# Patient Record
Sex: MALE | Race: AMERICAN INDIAN OR ALASKA NATIVE | NOT HISPANIC OR LATINO | URBAN - NONMETROPOLITAN AREA
[De-identification: names, ages, dates, MRNs, and addresses within clinical notes are randomized per-mention and may not be internally consistent; named-entity substitution may affect disease eponyms.]

---

## 2019-09-20 NOTE — PATIENT DISCUSSION
New Prescription: Maxitrol (neomycin-polymyxin-dexameth): ointment: 3.5-10,000-0.1 mg-unit/g-% 1 a small amount four times a day into left eye 09-

## 2019-10-23 NOTE — PATIENT DISCUSSION
Pt asked for glasses Rx and advised likely will NOT give clear vision due to cataract blur and advised better to consult with [facility].

## 2019-11-27 ENCOUNTER — IMPORTED ENCOUNTER (OUTPATIENT)
Dept: URBAN - NONMETROPOLITAN AREA CLINIC 1 | Facility: CLINIC | Age: 69
End: 2019-11-27

## 2019-11-27 PROBLEM — H52.03: Noted: 2019-11-27

## 2019-11-27 PROBLEM — H16.223: Noted: 2019-11-27

## 2019-11-27 PROBLEM — H52.4: Noted: 2019-11-27

## 2019-11-27 PROBLEM — H25.13: Noted: 2019-11-27

## 2019-11-27 PROBLEM — H52.223: Noted: 2019-11-27

## 2019-11-27 PROCEDURE — 92015 DETERMINE REFRACTIVE STATE: CPT

## 2019-11-27 PROCEDURE — 92014 COMPRE OPH EXAM EST PT 1/>: CPT

## 2020-01-07 NOTE — PATIENT DISCUSSION
PTERYGIUM, OS:  SLOWLY WORSENING. PRESCRIBE ARTIFICIAL TEARS AS NEEDED AND INCREASE UV PROTECTION. SCHEDULE SURGERY.

## 2020-01-14 NOTE — PATIENT DISCUSSION
Continue: Maxitrol (neomycin-polymyxin-dexameth): ointment: 3.5-10,000-0.1 mg-unit/g-% a small amount four times a day into left eye

## 2020-05-05 ENCOUNTER — IMPORTED ENCOUNTER (OUTPATIENT)
Dept: URBAN - NONMETROPOLITAN AREA CLINIC 1 | Facility: CLINIC | Age: 70
End: 2020-05-05

## 2020-05-05 PROBLEM — H16.293: Noted: 2020-05-05

## 2020-05-05 PROBLEM — H52.03: Noted: 2020-05-05

## 2020-05-05 PROBLEM — H16.223: Noted: 2020-05-05

## 2020-05-05 PROBLEM — H25.13: Noted: 2020-05-05

## 2020-05-05 PROBLEM — H52.4: Noted: 2020-05-05

## 2020-05-05 PROBLEM — H52.223: Noted: 2020-05-05

## 2020-05-05 PROCEDURE — 92012 INTRM OPH EXAM EST PATIENT: CPT

## 2020-05-05 NOTE — PATIENT DISCUSSION
Keratoconjunctivitis OU-  discussed findings w/patient-  start Tobradex QID OU-  start Refresh PF QID OU-  RTC 1 week f/u--------------------------------notes below from previous--------------Cataracts OU-  discussed findings w/patient-  no treatment indicated at this time-  UV protection recommended-  continue to monitor yearly or prnDES OU-  discussed findings w/patient-  patient's signs/symptoms are worsening with current treatment-  start Restasis BID OU Rx sent to [facility]-  continue AT's PRN OU-  continue bedtime nj QHS OU-  RTC 1 year or prn; 's Notes: MR 11/27/2019DFE 11/27/2019

## 2020-05-14 ENCOUNTER — IMPORTED ENCOUNTER (OUTPATIENT)
Dept: URBAN - NONMETROPOLITAN AREA CLINIC 1 | Facility: CLINIC | Age: 70
End: 2020-05-14

## 2020-05-14 PROCEDURE — 99213 OFFICE O/P EST LOW 20 MIN: CPT

## 2020-05-14 NOTE — PATIENT DISCUSSION
Keratoconjunctivitis OU-  discussed findings w/patient-  d/c Tobradex QID OU-  continue Refresh PF QID OU-  NOTE: 5/14/2020 Patient unable to start Restasis d/t cost.-  start PF 1% QID x 2 weeks then BID x 2 weeks then RTC -  RTC 4 week f/u--------------------------------notes below from previous--------------Cataracts OU-  discussed findings w/patient-  no treatment indicated at this time-  UV protection recommended-  continue to monitor yearly or prnDES OU-  discussed findings w/patient-  patient's signs/symptoms are worsening with current treatment-  start Restasis BID OU Rx sent to [address] [place] [address]-  continue AT's PRN OU-  continue bedtime nj QHS OU-  RTC 1 year or prn; 's Notes: MR 11/27/2019DFE 11/27/2019

## 2020-06-23 ENCOUNTER — IMPORTED ENCOUNTER (OUTPATIENT)
Dept: URBAN - NONMETROPOLITAN AREA CLINIC 1 | Facility: CLINIC | Age: 70
End: 2020-06-23

## 2020-06-23 PROCEDURE — 99213 OFFICE O/P EST LOW 20 MIN: CPT

## 2020-06-23 NOTE — PATIENT DISCUSSION
Keratoconjunctivitis OU-  discussed findings w/patient-  patient was unable to afford Restasis -  tried to insert punctum plugs today but the punctum were extremely small and was not able to get plugs placed-  taper generic Pred Forte 1% BID OU-  start Xiidra BID OU Rx sent to pharmacy-  RTC 2-3 week f/u; 's Notes: MR 11/27/2019DFE 11/27/2019

## 2020-06-30 NOTE — PATIENT DISCUSSION
Stopped Today: Lotemax SM (loteprednol etabonate): drops,gel: 0.38% 1 drop every morning into left eye

## 2020-07-07 NOTE — PATIENT DISCUSSION
The patient feels that the cataract is significantly impacting daily activities and has elected cataract surgery. The risks, benefits, and alternatives to surgery were discussed. The patient elects to proceed with surgery. Composite Graft Text: The defect edges were debeveled with a #15 scalpel blade.  Given the location of the defect, shape of the defect, the proximity to free margins and the fact the defect was full thickness a composite graft was deemed most appropriate.  The defect was outline and then transferred to the donor site.  A full thickness graft was then excised from the donor site. The graft was then placed in the primary defect, oriented appropriately and then sutured into place.  The secondary defect was then repaired using a primary closure.

## 2020-07-08 NOTE — PATIENT DISCUSSION
Cataract surgery has been performed in the first eye and activities of daily living are still impaired. The patient would like to proceed with cataract surgery in the second eye as scheduled. The patient elects IOL OD Goal Emmetropia Basic.

## 2020-07-14 ENCOUNTER — PREPPED CHART (OUTPATIENT)
Dept: URBAN - NONMETROPOLITAN AREA CLINIC 4 | Facility: CLINIC | Age: 70
End: 2020-07-14

## 2020-07-14 ENCOUNTER — IMPORTED ENCOUNTER (OUTPATIENT)
Dept: URBAN - NONMETROPOLITAN AREA CLINIC 1 | Facility: CLINIC | Age: 70
End: 2020-07-14

## 2020-07-14 PROCEDURE — 99213 OFFICE O/P EST LOW 20 MIN: CPT

## 2020-07-14 NOTE — PATIENT DISCUSSION
ASHU/Keratoconjunctivitis OU-  discussed findings w/patient-  patient was unable to afford Restasis -  tried to insert punctum plugs today but the punctum were extremely small and was not able to get plugs placed- patient states he is using Refresh [place] 3 OU BID-TID PRN- d/c Pred Forte at this time- continue Xiidra OU BID as directed-  RTC 3 months for recheckCataracts OU-  discussed findings w/patient-  no treatment indicated at this time-  UV protection recommended-  continue to monitor yearly or prn; 's Notes: MR 11/27/2019DFE 11/27/2019

## 2022-03-08 ASSESSMENT — TONOMETRY
OS_IOP_MMHG: 18
OD_IOP_MMHG: 18

## 2022-03-08 ASSESSMENT — VISUAL ACUITY
OD_CC: 20/30-2
OS_CC: 20/30+2

## 2022-03-10 ENCOUNTER — COMPREHENSIVE EXAM (OUTPATIENT)
Dept: URBAN - NONMETROPOLITAN AREA CLINIC 4 | Facility: CLINIC | Age: 72
End: 2022-03-10

## 2022-03-10 DIAGNOSIS — H52.03: ICD-10-CM

## 2022-03-10 DIAGNOSIS — H16.223: ICD-10-CM

## 2022-03-10 DIAGNOSIS — H25.13: ICD-10-CM

## 2022-03-10 PROCEDURE — 92015 DETERMINE REFRACTIVE STATE: CPT

## 2022-03-10 PROCEDURE — 92014 COMPRE OPH EXAM EST PT 1/>: CPT

## 2022-03-10 ASSESSMENT — VISUAL ACUITY
OS_CC: 20/25-2
OS_BAT: 20/50
OD_CC: 20/50
OD_BAT: 20/100
OD_PH: 20/50+1

## 2022-03-10 NOTE — PATIENT DISCUSSION
(Surgical) Visually Significant (secondary to glare), discussed the risks, benefits, alternatives, and limitations of cataract surgery. The patient stated a full understanding and a desire to proceed with the procedure. The patient will need to return for pre-op appointment with cataract measurements and to have any additional questions answered, and start pre-operative eye drops as directed.

## 2022-03-10 NOTE — PATIENT DISCUSSION
Will have patient start back on Xiidra BID OU, samples given.  Will help patient w/[employer] paperwork.

## 2022-04-09 ASSESSMENT — VISUAL ACUITY
OD_SC: 20/60+1
OS_SC: 20/30+2
OS_SC: 20/25+2
OD_PH: 20/30
OD_SC: 20/40-2
OD_SC: 20/50+2
OS_SC: 20/200+1
OD_PH: 20/50+1
OS_PH: 20/40
OD_SC: 20/30-2
OS_SC: 20/30-2

## 2022-04-09 ASSESSMENT — TONOMETRY
OD_IOP_MMHG: 18
OS_IOP_MMHG: 18
OD_IOP_MMHG: 18
OD_IOP_MMHG: 17
OS_IOP_MMHG: 18
OS_IOP_MMHG: 18
OS_IOP_MMHG: 17
OS_IOP_MMHG: 18
OD_IOP_MMHG: 18
OD_IOP_MMHG: 18

## 2022-04-26 ENCOUNTER — CONSULTATION/EVALUATION (OUTPATIENT)
Dept: URBAN - NONMETROPOLITAN AREA CLINIC 4 | Facility: CLINIC | Age: 72
End: 2022-04-26

## 2022-04-26 DIAGNOSIS — H25.13: ICD-10-CM

## 2022-04-26 PROCEDURE — 99214 OFFICE O/P EST MOD 30 MIN: CPT

## 2022-04-26 ASSESSMENT — VISUAL ACUITY
OS_CC: 20/30
OS_PH: 20/30
OS_SC: 20/400
OD_SC: 20/100
OD_CC: 20/50
OS_AM: 20/30
OD_CC: 20/100
OD_PH: 20/40
OD_PAM: 20/40-1
OS_CC: 20/40

## 2022-04-26 ASSESSMENT — TONOMETRY
OS_IOP_MMHG: 14
OD_IOP_MMHG: 14

## 2022-04-26 NOTE — PATIENT DISCUSSION
(Surgical) Visually Significant (secondary to glare), discussed the risks, benefits, alternatives, and limitations of cataract surgery. The patient stated a full understanding and a desire to proceed with the procedure. The patient will need to return for pre-op appointment with cataract measurements and to have any additional questions answered, and start pre-operative eye drops as directed. Pt elects to proceed with CE OS first and then OD after with LRI/LenSx OU & discussed the need for reading glasses. Pt elects LenSx OU. Dextenza OU +.

## 2022-06-02 ENCOUNTER — PRE-OP/H&P (OUTPATIENT)
Dept: URBAN - NONMETROPOLITAN AREA CLINIC 4 | Facility: CLINIC | Age: 72
End: 2022-06-02

## 2022-06-02 VITALS
SYSTOLIC BLOOD PRESSURE: 113 MMHG | HEART RATE: 110 BPM | HEIGHT: 72 IN | BODY MASS INDEX: 30.88 KG/M2 | WEIGHT: 228 LBS | DIASTOLIC BLOOD PRESSURE: 76 MMHG

## 2022-06-02 DIAGNOSIS — H16.223: ICD-10-CM

## 2022-06-02 PROCEDURE — 99499 UNLISTED E&M SERVICE: CPT

## 2022-06-23 ENCOUNTER — POST-OP (OUTPATIENT)
Dept: URBAN - METROPOLITAN AREA SURGERY 3 | Facility: SURGERY | Age: 72
End: 2022-06-23

## 2022-06-23 ENCOUNTER — SURGERY/PROCEDURE (OUTPATIENT)
Dept: URBAN - METROPOLITAN AREA SURGERY 3 | Facility: SURGERY | Age: 72
End: 2022-06-23

## 2022-06-23 DIAGNOSIS — Z96.1: ICD-10-CM

## 2022-06-23 DIAGNOSIS — H16.223: ICD-10-CM

## 2022-06-23 DIAGNOSIS — H25.12: ICD-10-CM

## 2022-06-23 PROCEDURE — 66984 XCAPSL CTRC RMVL W/O ECP: CPT

## 2022-06-23 PROCEDURE — 99024 POSTOP FOLLOW-UP VISIT: CPT

## 2022-06-23 PROCEDURE — 68841 INSJ RX ELUT IMPLT LAC CANAL: CPT

## 2022-06-23 ASSESSMENT — TONOMETRY: OS_IOP_MMHG: 24

## 2022-06-23 ASSESSMENT — VISUAL ACUITY: OS_SC: 20/200

## 2022-06-30 ENCOUNTER — POST OP/EVAL OF SECOND EYE (OUTPATIENT)
Dept: RURAL CLINIC 1 | Facility: CLINIC | Age: 72
End: 2022-06-30

## 2022-06-30 VITALS — HEIGHT: 60 IN | SYSTOLIC BLOOD PRESSURE: 135 MMHG | DIASTOLIC BLOOD PRESSURE: 95 MMHG | HEART RATE: 112 BPM

## 2022-06-30 DIAGNOSIS — Z96.1: ICD-10-CM

## 2022-06-30 PROCEDURE — 99024 POSTOP FOLLOW-UP VISIT: CPT

## 2022-06-30 ASSESSMENT — TONOMETRY
OD_IOP_MMHG: 15
OS_IOP_MMHG: 15

## 2022-06-30 ASSESSMENT — VISUAL ACUITY
OS_SC: 20/20
OD_SC: 20/40-2
OD_BAT: 20/200

## 2022-06-30 NOTE — PATIENT DISCUSSION
Medical clearance done today. No outstanding concerns that would preclude surgery. Patient is cleared to proceed with scheduled surgery. Medically cleared for surgery but the anesthesia team will have to make appropriate decision to make patient cooperative.

## 2022-07-11 ENCOUNTER — SURGERY/PROCEDURE (OUTPATIENT)
Dept: URBAN - METROPOLITAN AREA SURGERY 3 | Facility: SURGERY | Age: 72
End: 2022-07-11

## 2022-07-11 ENCOUNTER — EMERGENCY VISIT (OUTPATIENT)
Dept: URBAN - NONMETROPOLITAN AREA CLINIC 4 | Facility: CLINIC | Age: 72
End: 2022-07-11

## 2022-07-11 DIAGNOSIS — H25.11: ICD-10-CM

## 2022-07-11 DIAGNOSIS — Z96.1: ICD-10-CM

## 2022-07-11 PROCEDURE — 66984 XCAPSL CTRC RMVL W/O ECP: CPT

## 2022-07-11 PROCEDURE — 92014 COMPRE OPH EXAM EST PT 1/>: CPT

## 2022-07-11 PROCEDURE — 68841 INSJ RX ELUT IMPLT LAC CANAL: CPT

## 2022-07-11 PROCEDURE — S9986 NOT MEDICALLY NECESSARY SVC: HCPCS

## 2022-07-11 NOTE — PATIENT DISCUSSION
Patient just had sx, high IOP's. Vyzulta and burping done at [facility] [place] ([facility]). IOP's went from 40 to 19 after Vyzulta.

## 2022-07-12 ENCOUNTER — POST-OP (OUTPATIENT)
Dept: URBAN - NONMETROPOLITAN AREA CLINIC 4 | Facility: CLINIC | Age: 72
End: 2022-07-12

## 2022-07-12 DIAGNOSIS — Z96.1: ICD-10-CM

## 2022-07-12 PROCEDURE — 99024 POSTOP FOLLOW-UP VISIT: CPT

## 2022-07-12 ASSESSMENT — TONOMETRY
OD_IOP_MMHG: 19
OS_IOP_MMHG: 17
OD_IOP_MMHG: 19
OD_IOP_MMHG: 40
OD_IOP_MMHG: 15

## 2022-07-12 ASSESSMENT — VISUAL ACUITY
OD_SC: 20/100
OS_SC: 20/100
OD_SC: 20/70

## 2022-07-12 NOTE — PATIENT DISCUSSION
Patient just had sx, high IOP's. Vyzulta and burping done at [facility] ([facility]). IOP's went from 40 to 19 after Vyzulta.

## 2022-07-20 ENCOUNTER — POST-OP (OUTPATIENT)
Dept: URBAN - NONMETROPOLITAN AREA CLINIC 4 | Facility: CLINIC | Age: 72
End: 2022-07-20

## 2022-07-20 DIAGNOSIS — Z96.1: ICD-10-CM

## 2022-07-20 PROCEDURE — 99024 POSTOP FOLLOW-UP VISIT: CPT

## 2022-07-20 ASSESSMENT — TONOMETRY
OS_IOP_MMHG: 16
OD_IOP_MMHG: 14

## 2022-07-20 ASSESSMENT — VISUAL ACUITY
OS_SC: 20/40+1
OD_SC: 20/25

## 2022-09-22 ENCOUNTER — EMERGENCY VISIT (OUTPATIENT)
Dept: URBAN - NONMETROPOLITAN AREA CLINIC 4 | Facility: CLINIC | Age: 72
End: 2022-09-22

## 2022-09-22 DIAGNOSIS — Z96.1: ICD-10-CM

## 2022-09-22 PROCEDURE — 99024 POSTOP FOLLOW-UP VISIT: CPT

## 2022-09-22 RX ORDER — NEOMYCIN SULFATE, POLYMYXIN B SULFATE AND DEXAMETHASONE 3.5; 10000; 1 MG/ML; [USP'U]/ML; MG/ML
1 SUSPENSION OPHTHALMIC
Start: 2022-09-22

## 2022-09-22 ASSESSMENT — VISUAL ACUITY
OD_SC: 20/30
OU_SC: 20/25
OS_SC: 20/30

## 2022-09-22 ASSESSMENT — TONOMETRY
OD_IOP_MMHG: 14
OS_IOP_MMHG: 15

## 2022-12-08 ENCOUNTER — EMERGENCY VISIT (OUTPATIENT)
Dept: URBAN - NONMETROPOLITAN AREA CLINIC 4 | Facility: CLINIC | Age: 72
End: 2022-12-08

## 2022-12-08 PROCEDURE — 92134 CPTRZ OPH DX IMG PST SGM RTA: CPT

## 2022-12-08 PROCEDURE — 92015 DETERMINE REFRACTIVE STATE: CPT

## 2022-12-08 PROCEDURE — 99214 OFFICE O/P EST MOD 30 MIN: CPT

## 2022-12-08 RX ORDER — LOTEPREDNOL ETABONATE 5 MG/ML: 1 SUSPENSION/ DROPS OPHTHALMIC

## 2022-12-08 ASSESSMENT — VISUAL ACUITY
OD_BAT: 20/80
OS_SC: 20/30
OS_BAT: 20/50
OD_SC: 20/40

## 2022-12-08 NOTE — PATIENT DISCUSSION
OCT done today. ERM noted will bring patient back in a month to scan again to see if there is any progression. Observe for change.

## 2022-12-08 NOTE — PATIENT DISCUSSION
1 week PO: Patient is doing well post-operatively. The importance of post-op drop compliance was emphasized. Drop scheduled reviewed with patient. Patient to call if any visual changes or concerns. “You can access the [facility] Patient Portal, offered by [facility], by registering with the following website: [email or web address]”

## 2022-12-08 NOTE — PATIENT DISCUSSION
Discussed diagnosis in detail with patient. Recommend patient using Lotemax QID for a week, TID for a week, BID for a week, once a day for a week. Will see patient in a month for follow up. Continue to monitor.

## 2023-01-10 ENCOUNTER — FOLLOW UP (OUTPATIENT)
Dept: URBAN - NONMETROPOLITAN AREA CLINIC 4 | Facility: CLINIC | Age: 73
End: 2023-01-10

## 2023-01-10 DIAGNOSIS — H10.13: ICD-10-CM

## 2023-01-10 DIAGNOSIS — H01.014: ICD-10-CM

## 2023-01-10 PROCEDURE — 99214 OFFICE O/P EST MOD 30 MIN: CPT

## 2023-01-10 RX ORDER — CYCLOSPORINE 0.5 MG/ML: 1 EMULSION OPHTHALMIC TWICE A DAY

## 2023-01-10 ASSESSMENT — TONOMETRY
OS_IOP_MMHG: 11
OD_IOP_MMHG: 11

## 2023-01-10 ASSESSMENT — VISUAL ACUITY
OD_SC: 20/40
OS_SC: 20/25

## 2023-01-10 NOTE — PATIENT DISCUSSION
OCT done previously. ERM noted will bring patient back in a month to scan again to see if there is any progression. Observe for change.

## 2023-08-21 ENCOUNTER — FOLLOW UP (OUTPATIENT)
Dept: RURAL CLINIC 1 | Facility: CLINIC | Age: 73
End: 2023-08-21

## 2023-08-21 DIAGNOSIS — Z96.1: ICD-10-CM

## 2023-08-21 DIAGNOSIS — H35.373: ICD-10-CM

## 2023-08-21 DIAGNOSIS — H16.223: ICD-10-CM

## 2023-08-21 PROCEDURE — 92014 COMPRE OPH EXAM EST PT 1/>: CPT

## 2023-08-21 ASSESSMENT — TONOMETRY
OS_IOP_MMHG: 15
OD_IOP_MMHG: 15

## 2023-08-21 ASSESSMENT — VISUAL ACUITY
OS_PH: 20/25
OD_SC: 20/30
OS_SC: 20/25
OD_PH: 20/30

## 2025-05-07 ENCOUNTER — EMERGENCY VISIT (OUTPATIENT)
Age: 75
End: 2025-05-07

## 2025-05-07 DIAGNOSIS — H16.223: ICD-10-CM

## 2025-05-07 DIAGNOSIS — Z96.1: ICD-10-CM

## 2025-05-07 DIAGNOSIS — H35.373: ICD-10-CM

## 2025-05-07 PROCEDURE — 99213 OFFICE O/P EST LOW 20 MIN: CPT

## 2025-08-06 ENCOUNTER — FOLLOW UP (OUTPATIENT)
Age: 75
End: 2025-08-06

## 2025-08-06 DIAGNOSIS — H35.373: ICD-10-CM

## 2025-08-06 DIAGNOSIS — Z96.1: ICD-10-CM

## 2025-08-06 DIAGNOSIS — H16.223: ICD-10-CM

## 2025-08-06 PROCEDURE — 99213 OFFICE O/P EST LOW 20 MIN: CPT
